# Patient Record
Sex: MALE | Race: BLACK OR AFRICAN AMERICAN | Employment: UNEMPLOYED | URBAN - METROPOLITAN AREA
[De-identification: names, ages, dates, MRNs, and addresses within clinical notes are randomized per-mention and may not be internally consistent; named-entity substitution may affect disease eponyms.]

---

## 2019-10-18 ENCOUNTER — HOSPITAL ENCOUNTER (EMERGENCY)
Age: 49
Discharge: HOME | End: 2019-10-19
Attending: EMERGENCY MEDICINE | Admitting: EMERGENCY MEDICINE
Payer: MEDICAID

## 2019-10-18 ENCOUNTER — APPOINTMENT (OUTPATIENT)
Dept: MRI IMAGING | Age: 49
End: 2019-10-18
Attending: EMERGENCY MEDICINE
Payer: MEDICAID

## 2019-10-18 DIAGNOSIS — E87.1 HYPONATREMIA: ICD-10-CM

## 2019-10-18 DIAGNOSIS — R73.9 HYPERGLYCEMIA: Primary | ICD-10-CM

## 2019-10-18 DIAGNOSIS — R56.9 ALCOHOL WITHDRAWAL SEIZURE WITHOUT COMPLICATION (HCC): ICD-10-CM

## 2019-10-18 DIAGNOSIS — F10.930 ALCOHOL WITHDRAWAL SEIZURE WITHOUT COMPLICATION (HCC): ICD-10-CM

## 2019-10-18 LAB
ALBUMIN SERPL-MCNC: 4.1 G/DL (ref 3.4–5)
ALBUMIN/GLOB SERPL: 1 {RATIO} (ref 0.8–1.7)
ALP SERPL-CCNC: 96 U/L (ref 45–117)
ALT SERPL-CCNC: 26 U/L (ref 16–61)
ANION GAP SERPL CALC-SCNC: 10 MMOL/L (ref 3–18)
AST SERPL-CCNC: 17 U/L (ref 10–38)
BASOPHILS # BLD: 0 K/UL (ref 0–0.1)
BASOPHILS NFR BLD: 1 % (ref 0–2)
BILIRUB SERPL-MCNC: 0.4 MG/DL (ref 0.2–1)
BUN SERPL-MCNC: 22 MG/DL (ref 7–18)
BUN/CREAT SERPL: 14 (ref 12–20)
CALCIUM SERPL-MCNC: 9.6 MG/DL (ref 8.5–10.1)
CHLORIDE SERPL-SCNC: 83 MMOL/L (ref 100–111)
CK MB CFR SERPL CALC: 2.1 % (ref 0–4)
CK MB SERPL-MCNC: 1.9 NG/ML (ref 5–25)
CK SERPL-CCNC: 92 U/L (ref 39–308)
CO2 SERPL-SCNC: 27 MMOL/L (ref 21–32)
CREAT SERPL-MCNC: 1.58 MG/DL (ref 0.6–1.3)
DIFFERENTIAL METHOD BLD: ABNORMAL
EOSINOPHIL # BLD: 0 K/UL (ref 0–0.4)
EOSINOPHIL NFR BLD: 1 % (ref 0–5)
ERYTHROCYTE [DISTWIDTH] IN BLOOD BY AUTOMATED COUNT: 13.4 % (ref 11.6–14.5)
ETHANOL SERPL-MCNC: <3 MG/DL (ref 0–3)
GLOBULIN SER CALC-MCNC: 4.1 G/DL (ref 2–4)
GLUCOSE BLD STRIP.AUTO-MCNC: 400 MG/DL (ref 70–110)
GLUCOSE BLD STRIP.AUTO-MCNC: >600 MG/DL (ref 70–110)
GLUCOSE SERPL-MCNC: 776 MG/DL (ref 74–99)
HCT VFR BLD AUTO: 32.7 % (ref 36–48)
HGB BLD-MCNC: 10.4 G/DL (ref 13–16)
LYMPHOCYTES # BLD: 1.2 K/UL (ref 0.9–3.6)
LYMPHOCYTES NFR BLD: 31 % (ref 21–52)
MCH RBC QN AUTO: 24.5 PG (ref 24–34)
MCHC RBC AUTO-ENTMCNC: 31.8 G/DL (ref 31–37)
MCV RBC AUTO: 77.1 FL (ref 74–97)
MONOCYTES # BLD: 0.3 K/UL (ref 0.05–1.2)
MONOCYTES NFR BLD: 9 % (ref 3–10)
NEUTS SEG # BLD: 2.3 K/UL (ref 1.8–8)
NEUTS SEG NFR BLD: 58 % (ref 40–73)
PLATELET # BLD AUTO: 289 K/UL (ref 135–420)
PMV BLD AUTO: 11.4 FL (ref 9.2–11.8)
POTASSIUM SERPL-SCNC: 4.2 MMOL/L (ref 3.5–5.5)
PROT SERPL-MCNC: 8.2 G/DL (ref 6.4–8.2)
RBC # BLD AUTO: 4.24 M/UL (ref 4.7–5.5)
SODIUM SERPL-SCNC: 120 MMOL/L (ref 136–145)
TROPONIN I SERPL-MCNC: <0.02 NG/ML (ref 0–0.04)
WBC # BLD AUTO: 3.9 K/UL (ref 4.6–13.2)

## 2019-10-18 PROCEDURE — 82550 ASSAY OF CK (CPK): CPT

## 2019-10-18 PROCEDURE — 80053 COMPREHEN METABOLIC PANEL: CPT

## 2019-10-18 PROCEDURE — 96361 HYDRATE IV INFUSION ADD-ON: CPT

## 2019-10-18 PROCEDURE — 74011636637 HC RX REV CODE- 636/637: Performed by: EMERGENCY MEDICINE

## 2019-10-18 PROCEDURE — 96360 HYDRATION IV INFUSION INIT: CPT

## 2019-10-18 PROCEDURE — 80307 DRUG TEST PRSMV CHEM ANLYZR: CPT

## 2019-10-18 PROCEDURE — 99285 EMERGENCY DEPT VISIT HI MDM: CPT

## 2019-10-18 PROCEDURE — 93005 ELECTROCARDIOGRAM TRACING: CPT

## 2019-10-18 PROCEDURE — 85025 COMPLETE CBC W/AUTO DIFF WBC: CPT

## 2019-10-18 PROCEDURE — 80048 BASIC METABOLIC PNL TOTAL CA: CPT

## 2019-10-18 PROCEDURE — 70551 MRI BRAIN STEM W/O DYE: CPT

## 2019-10-18 PROCEDURE — 82962 GLUCOSE BLOOD TEST: CPT

## 2019-10-18 PROCEDURE — 74011250636 HC RX REV CODE- 250/636: Performed by: PHYSICIAN ASSISTANT

## 2019-10-18 PROCEDURE — 74011250636 HC RX REV CODE- 250/636: Performed by: EMERGENCY MEDICINE

## 2019-10-18 RX ADMIN — INSULIN HUMAN 10 UNITS: 100 INJECTION, SOLUTION PARENTERAL at 18:53

## 2019-10-18 RX ADMIN — SODIUM CHLORIDE 1000 ML: 900 INJECTION, SOLUTION INTRAVENOUS at 18:52

## 2019-10-18 RX ADMIN — SODIUM CHLORIDE 1000 ML: 900 INJECTION, SOLUTION INTRAVENOUS at 22:01

## 2019-10-18 RX ADMIN — INSULIN HUMAN 6 UNITS: 100 INJECTION, SOLUTION PARENTERAL at 23:02

## 2019-10-18 NOTE — LETTER
NOTIFICATION RETURN TO WORK / SCHOOL 
 
10/19/2019 1:55 AM 
 
Mr. Yasmine Miller. Countlobito III 
[address] [id number] To Whom It May Concern: 
 
Maya Fadia Howard BEAVER is currently under the care of [facility] EMERGENCY DEPT. He will return to work/school on: 10/24/19 If there are questions or concerns please have the patient contact our office. Sincerely, Sanford Lewis, DO

## 2019-10-18 NOTE — ED PROVIDER NOTES
EMERGENCY DEPARTMENT HISTORY AND PHYSICAL EXAM    7:07 PM      Date: 10/18/2019  Patient Name: Vandana Lawrence III    History of Presenting Illness     Chief Complaint   Patient presents with    High Blood Sugar    Numbness         History Provided By: Patient      Additional History (Context): Vandana Lawrence III is a 44 y.o. male with diabetes who presents with right-sided weakness and numbness over the last 1 week. Patient also reports that his right hand/arm has had tremors since the beginning of this month. He denies similar symptoms previously. Patient's blood sugar was elevated. He notes that he has been out of his insulin for \"a while\". Patient was brought in after a fall today. He denies tobacco, ETOH, and drug use. He denies chest pain, SOB, difficulty urinating, and dysuria. No other modifying or aggravating factors were reported. No other concerns or symptoms at this time. PCP: None    Chief Complaint: Weakness  Duration:  1 week  Timing:  Constant  Location: right-sided  Quality: N/A  Severity: N/A  Modifying Factors: patient out of insulin  Associated Symptoms: right-sided numbness, tremor right arm/hand        Past History     Past Medical History:  Past Medical History:   Diagnosis Date    Diabetes ([facility] [address]        Past Surgical History:  History reviewed. No pertinent surgical history. Family History:  History reviewed. No pertinent family history. Social History:  Social History     Tobacco Use    Smoking status: Never Smoker    Smokeless tobacco: Never Used   Substance Use Topics    Alcohol use: Not on file    Drug use: Not on file       Allergies:  No Known Allergies      Review of Systems       Review of Systems   Constitutional: Negative for activity change, fatigue and fever. HENT: Negative for congestion and rhinorrhea. Eyes: Negative for visual disturbance. Respiratory: Negative for shortness of breath. Cardiovascular: Negative for chest pain and palpitations. Gastrointestinal: Negative for abdominal pain, diarrhea, nausea and vomiting. Genitourinary: Negative for difficulty urinating, dysuria and hematuria. Musculoskeletal: Negative for back pain. Skin: Negative for rash. Neurological: Positive for tremors, weakness and numbness. Negative for dizziness and light-headedness. All other systems reviewed and are negative. Physical Exam     Visit Vitals  /84   Pulse 77   Temp 97.8 °F (36.6 °C)   Resp 12   Ht 6' (1.829 m)   Wt 65.3 kg (144 lb)   SpO2 100%   BMI 19.53 kg/m²         Physical Exam   Constitutional: He is oriented to person, place, and time. He appears well-developed and well-nourished. No distress. HENT:   Head: Normocephalic and atraumatic. Right Ear: External ear normal.   Left Ear: External ear normal.   Nose: Nose normal.   Dry   Eyes: Pupils are equal, round, and reactive to light. Conjunctivae and EOM are normal.   Neck: Normal range of motion. Neck supple. No tracheal deviation present. Cardiovascular: Normal rate, regular rhythm and intact distal pulses. Pulmonary/Chest: Effort normal and breath sounds normal. He exhibits no tenderness. Abdominal: Soft. Bowel sounds are normal. He exhibits no distension. There is no tenderness. There is no rebound and no guarding. Musculoskeletal: Normal range of motion. He exhibits no edema or tenderness. Neurological: He is alert and oriented to person, place, and time. No cranial nerve deficit. Coordination normal.   Skin: Skin is warm and dry. Psychiatric: He has a normal mood and affect. His behavior is normal. Judgment and thought content normal.   Nursing note and vitals reviewed. Diagnostic Study Results     Labs -  No results found for this or any previous visit (from the past 12 hour(s)). Radiologic Studies -   MRI BRAIN WO CONT   Final Result   Impression:   No significant intracranial abnormality is demonstrated. No evidence of acute   infarction.  No MRI evidence of acute hemorrhage but CT is more sensitive. Right-sided substantial sinusitis as described. Possible nasal polyposis. Initial after-hours report was provided by [facility]. Medical Decision Making     It should be noted that ILisa DO will be the provider of record for this patient. I reviewed the vital signs, available nursing notes, past medical history, past surgical history, family history and social history. Vital Signs-Reviewed the patient's vital signs. Pulse Oximetry Analysis -  99% on room air , nml    Cardiac Monitor:  Rate: 100 BPM    EKG: Interpreted by the EP. Time Interpreted: 17:43   Rate: 81 BPM       Records Reviewed: Nursing Notes and Old Medical Records (Time of Review: 7:07 PM)    Orders Placed This Encounter    MRI BRAIN WO CONT    CBC WITH AUTOMATED DIFF    METABOLIC PANEL, COMPREHENSIVE    CARDIAC PANEL,(CK, CKMB & TROPONIN)    ETHYL ALCOHOL    BASIC METABOLIC PANEL    GLUCOSE, POC    GLUCOSE, POC    EKG, 12 LEAD, INITIAL    sodium chloride 0.9 % bolus infusion 1,000 mL    sodium chloride 0.9 % bolus infusion 1,000 mL    insulin regular (NOVOLIN R, HUMULIN R) injection 10 Units    sodium chloride 0.9 % bolus infusion 1,000 mL    insulin regular (NOVOLIN R, HUMULIN R) injection 6 Units    LORazepam (ATIVAN) tablet 1 mg    thiamine mononitrate (B-1) tablet 50 mg    folic acid (FOLVITE) tablet 1 mg    diazePAM (VALIUM) tablet 5 mg    chlordiazePOXIDE (LIBRIUM) 25 mg capsule    folic acid (FOLVITE) 1 mg tablet    thiamine HCL (B-1) 100 mg tablet    DISCONTD: folic acid (FOLVITE) 1 mg tablet    DISCONTD: thiamine mononitrate (B-1) 100 mg tablet       ED Course: Progress Notes, Reevaluation, and Consults:  9:59 PM  Consult:  Discussed care with Dr. Laine Alfonso ([address] [place] [address]) Standard discussion; including history of patients chief complaint, available diagnostic results, and treatment course.  States that patient may be able to follow-up for outpatient work-up. 12:19 AM  Patient's glucose is 160 and Sodium is 137. Provider Notes (Medical Decision Making):   Patient is a 53-year-old male who comes in tonight because he is not feeling well and had some lower extremity weakness. No acute findings on MRI. Patient was noted to have some focal complex seizure activity on the right. It is possible that her symptoms are from Yuan's paralysis. Patient with initial sodium of 120 and blood sugar over 700. This is been corrected in the emergency department. Patient apparently drinks heavily which she initially did not admit to me. He would like to stop drinking. Discussed at length with him and his family and will be prescribed a Librium taper. Stable for discharge. Return if further concerns. Understands agrees with plan. ED Course as of Oct 20 0157   Fri Oct 18, 2019   1735 Presented initially for diffuse numbness but it turns out he has had left upper extremity weakness since October 11 and perceives some problems with his left lower extremity and fell at work today. Initially Code S. On further exam his symptoms have been present for more than 24 hours, canceled Code S will evaluate for his hyperglycemia, and get MRI to evaluate for subacute stroke.    [CB]   1737 On my initial exam in the [facility] his cranial nerves are intact he has a slight delay in his  on the right side but ultimately 5 of 5 strength subjectively some numbness of the right upper extremity. Lower extremities are neuro intact. Speech is clear.    [CB]   [id number] I reviewed the EKG contemporaneously at 5:45 PM.  Sinus rhythm with PVCs at 81, diffusely oh millimeters of elevation. No prior for comparison. No reciprocal changes that would suggest an ischemic process and the patient does not have chest pain to correlate with ischemic process nor pericarditis.     [CB]      ED Course User Index  [CB] Neema Spears MD Diagnosis     Clinical Impression:   1. Hyperglycemia    2. Hyponatremia    3. Alcohol withdrawal seizure without complication [facility])        Disposition: Discharge    Follow-up Information     Follow up With Specialties Details Why Rafael [address] [place], [place] [id number]  [phone]           Discharge Medication List as of 10/19/2019  1:31 AM      START taking these medications    Details   chlordiazePOXIDE (LIBRIUM) 25 mg capsule Take 2 pills four times/ day for 1 day,  Then 2 pills three times/day for 1 day,  Then 2 pills twice/day for 1 day,  Then 2 pills once at night,  Then 1 pills once at night. DO NOT DRINK ALCOHOL WHEN TAKING LIBRIUM, Print, Disp-22 Cap, R-0      folic acid (FOLVITE) 1 mg tablet Take 1 Tab by mouth daily. , Normal, Disp-30 Tab, R-0      thiamine HCL (B-1) 100 mg tablet Take 1 Tab by mouth daily. , Normal, Disp-30 Tab, R-0           _______________________________       Scribe Maryam Ott acting as a scribe for and in the presence of Jessi Plunkett DO      October 20, 2019 at 1:57 AM       Provider Attestation:      I personally performed the services described in the documentation, reviewed the documentation, as recorded by the scribe in my presence, and it accurately and completely records my words and actions.  October 20, 2019 at 1:57 AM - Jessi GOODMAN DO       _______________________________

## 2019-10-18 NOTE — ED TRIAGE NOTES
Sent from [facility] with blood sugar greater then 700. Has not taken insulin for \"a while\". Right hand has felt numb for several weeks.

## 2019-10-18 NOTE — ED TRIAGE NOTES
Talked with provider from [facility]. Found pt fell at work landing on right side. Felt weakness was on right side. Pt only c/o right hand numbness . Code S level one called.

## 2019-10-18 NOTE — ED NOTES
I performed a brief evaluation, including history and physical, of the patient here in triage and I have determined that pt will need further treatment and evaluation from the main side ER physician. I have placed initial orders to help in expediting patients care.      October 18, 2019 at 5:08 PM - SHAUN Torres      CC: pt sent here from [facility], Chem8 GLU >700, Na 119    Visit Vitals  BP (!) 140/97 (BP 1 Location: Right arm, BP Patient Position: At rest)   Pulse 100   Temp 97.8 °F (36.6 °C)   Resp 16   Ht 6' (1.829 m)   Wt 65.3 kg (144 lb)   SpO2 99%   BMI 19.53 kg/m²         Medications ordered:   Medications   sodium chloride 0.9 % bolus infusion 1,000 mL (has no administration in time range)        Lab findings:   Labs Reviewed   GLUCOSE, POC - Abnormal; Notable for the following components:       Result Value    Glucose (POC) >600 (*)     All other components within normal limits   CBC WITH AUTOMATED DIFF   METABOLIC PANEL, COMPREHENSIVE   URINALYSIS W/ RFLX MICROSCOPIC

## 2019-10-18 NOTE — LETTER
[facility] EMERGENCY DEPT 
[address] 
[address] [facility] [address] [phone] Work/School Note Date: 10/18/2019 To Whom It May concern: 
 
Giovanni Lawrence III was seen and treated today in the emergency room by the following provider(s): 
No providers found. Giovanni Lawrence III may return to work on 10-24-19.   
 
Sincerely, 
 
 
 
 
Keron Alfonso RN

## 2019-10-19 VITALS
HEIGHT: 72 IN | HEART RATE: 77 BPM | SYSTOLIC BLOOD PRESSURE: 134 MMHG | BODY MASS INDEX: 19.5 KG/M2 | TEMPERATURE: 97.8 F | OXYGEN SATURATION: 100 % | WEIGHT: 144 LBS | RESPIRATION RATE: 12 BRPM | DIASTOLIC BLOOD PRESSURE: 84 MMHG

## 2019-10-19 LAB
ANION GAP SERPL CALC-SCNC: 8 MMOL/L (ref 3–18)
ATRIAL RATE: 81 BPM
BUN SERPL-MCNC: 15 MG/DL (ref 7–18)
BUN/CREAT SERPL: 16 (ref 12–20)
CALCIUM SERPL-MCNC: 8.9 MG/DL (ref 8.5–10.1)
CALCULATED P AXIS, ECG09: 62 DEGREES
CALCULATED R AXIS, ECG10: 36 DEGREES
CALCULATED T AXIS, ECG11: 50 DEGREES
CHLORIDE SERPL-SCNC: 102 MMOL/L (ref 100–111)
CO2 SERPL-SCNC: 27 MMOL/L (ref 21–32)
CREAT SERPL-MCNC: 0.96 MG/DL (ref 0.6–1.3)
DIAGNOSIS, 93000: NORMAL
GLUCOSE SERPL-MCNC: 160 MG/DL (ref 74–99)
P-R INTERVAL, ECG05: 150 MS
POTASSIUM SERPL-SCNC: 3.3 MMOL/L (ref 3.5–5.5)
Q-T INTERVAL, ECG07: 362 MS
QRS DURATION, ECG06: 88 MS
QTC CALCULATION (BEZET), ECG08: 420 MS
SODIUM SERPL-SCNC: 137 MMOL/L (ref 136–145)
VENTRICULAR RATE, ECG03: 81 BPM

## 2019-10-19 PROCEDURE — 74011250637 HC RX REV CODE- 250/637: Performed by: EMERGENCY MEDICINE

## 2019-10-19 RX ORDER — FOLIC ACID 1 MG/1
1 TABLET ORAL DAILY
Qty: 30 TAB | Refills: 0 | Status: SHIPPED | OUTPATIENT
Start: 2019-10-19

## 2019-10-19 RX ORDER — LORAZEPAM 1 MG/1
1 TABLET ORAL
Status: COMPLETED | OUTPATIENT
Start: 2019-10-19 | End: 2019-10-19

## 2019-10-19 RX ORDER — FOLIC ACID 1 MG/1
1 TABLET ORAL
Status: COMPLETED | OUTPATIENT
Start: 2019-10-19 | End: 2019-10-19

## 2019-10-19 RX ORDER — ASPIRIN 325 MG/1
50 TABLET, FILM COATED ORAL
Status: COMPLETED | OUTPATIENT
Start: 2019-10-19 | End: 2019-10-19

## 2019-10-19 RX ORDER — LANOLIN ALCOHOL/MO/W.PET/CERES
100 CREAM (GRAM) TOPICAL DAILY
Qty: 30 TAB | Refills: 0 | Status: SHIPPED | OUTPATIENT
Start: 2019-10-19

## 2019-10-19 RX ORDER — DIAZEPAM 5 MG/1
5 TABLET ORAL
Status: COMPLETED | OUTPATIENT
Start: 2019-10-19 | End: 2019-10-19

## 2019-10-19 RX ORDER — FOLIC ACID 1 MG/1
TABLET ORAL
Status: DISCONTINUED
Start: 2019-10-19 | End: 2019-10-19 | Stop reason: HOSPADM

## 2019-10-19 RX ORDER — CHLORDIAZEPOXIDE HYDROCHLORIDE 25 MG/1
CAPSULE, GELATIN COATED ORAL
Qty: 22 CAP | Refills: 0 | Status: SHIPPED | OUTPATIENT
Start: 2019-10-19

## 2019-10-19 RX ORDER — ASPIRIN 325 MG/1
TABLET, FILM COATED ORAL
Status: DISCONTINUED
Start: 2019-10-19 | End: 2019-10-19 | Stop reason: HOSPADM

## 2019-10-19 RX ADMIN — LORAZEPAM 1 MG: 1 TABLET ORAL at 01:15

## 2019-10-19 RX ADMIN — FOLIC ACID 1 MG: 1 TABLET ORAL at 01:15

## 2019-10-19 RX ADMIN — DIAZEPAM 5 MG: 5 TABLET ORAL at 01:37

## 2019-10-19 RX ADMIN — Medication 50 MG: at 01:13

## 2019-10-19 NOTE — ED NOTES
Discharge information reviewed with patient and family, verbalized understanding. Paperwork signed, VSS and patient in no distress.

## 2019-10-19 NOTE — DISCHARGE INSTRUCTIONS
Patient Education        Learning About Alcohol Withdrawal  What is alcohol withdrawal?    If you drink alcohol regularly (more than a few drinks on most days) and then suddenly stop or cut down, you may go through some physical and emotional problems while the alcohol clears out of your system. This is called withdrawal. Clearing the alcohol from your body is called detoxification, or detox. What are the symptoms? Symptoms of alcohol withdrawal may start as soon as 4 to 12 hours after you stop drinking. Or they may not start until several days after the last drink. Mild symptoms include:  · Nausea. · Sweating. · Shakiness. · Diarrhea. · Intense worry. · Disturbed sleep. · Headache. More severe symptoms include:  · Vomiting or belly pain. · Being confused, upset, and irritable. · Changed sensations. You might feel things on your body that aren't really there. Or you may see or hear things that aren't there. · Trembling. · Being short of breath or having pain in your chest.  · Having seizures. Symptoms may peak within a few days. Mild symptoms can last for a few weeks. If your symptoms are severe, you'll need to see a doctor. What is the treatment for alcohol withdrawal?  Most people may be able to cut down or stop drinking with only mild withdrawal. They can stay safe by simply resting, drinking lots of fluids, and eating healthy foods. But people who drink large amounts of alcohol or are at risk for severe withdrawal symptoms should not try to detox at home unless they work closely with a doctor to manage it. A person can die of severe alcohol withdrawal.  Before you stop drinking, talk to your doctor about how you plan to stop. Be completely honest about how much you've been drinking. Your doctor will figure out if you need to detox in a medical center. You may get medicine to treat the symptoms whether you are at home or in a medical center. Medicine that treats seizures can also help.  Your doctor will explain what types of medicine might help you. You may start with a high dose and then take smaller amounts over several days. There's also medicine that can help you avoid alcohol while you recover. How can you manage your withdrawal and recovery? Here are a few tips that can help you to not start drinking again. · Make sure there's no alcohol in the house. This includes drinks as well as liquid medicines, rubbing alcohol, and certain flavorings like vanilla extract. · Try not to hang out with people you used to drink with. · Don't go it alone. Spend time with people who support the changes you are making in your life. This includes asking for advice and help from people who have stopped drinking. You might also try mutual support groups such as Alcoholics Anonymous. · Drink lots of fluids. · Eat snacks such as fruit, cheese and crackers, and pretzels. High-carbohydrate foods may help reduce the craving for alcohol. What happens after withdrawal?  It can be hard to stop drinking. But after you clear the alcohol from your system, you can start the next, healthier part of your life. After detox, you will focus on staying alcohol-free. You can learn skills that you can use to stay abstinent (or sober) as you recover. Finding new ways to deal with life's challenges, without drinking, takes time and effort. Recovery is a long-term process. It's not something you can achieve in a few weeks. Most people get some type of therapy, such as group counseling. You also may need medicine to help you stay sober. Treatment doesn't focus on alcohol use alone. It may address other parts of your life, like your relationships, work, medical problems, and home life. Treatment, support, patience, and commitment will help you make the changes you need to live a reynolds life without alcohol.  You may find, over time, that the process gets easier, life becomes more joyous, and your connections to others becomes more rewarding. Where can you find help? Behavioral Health Treatment Services . This service from the [facility] Substance Abuse and [facility]  can help you find local alcohol treatment services. Search online at [employer]. [facility].gov or call 2-245-265-RBBC (309 267 394), or [employer] 8-821.768.2530. Where can you learn more? Go to http://johnnie-jeferson.info/. Enter A011 in the search box to learn more about \"Learning About Alcohol Withdrawal.\"  Current as of: February 5, 2019  Content Version: 12.2  © 5445-3109 [employer]. Care instructions adapted under license by [employer] (which disclaims liability or warranty for this information). If you have questions about a medical condition or this instruction, always ask your healthcare professional. [address] any warranty or liability for your use of this information.